# Patient Record
Sex: MALE | Race: BLACK OR AFRICAN AMERICAN | Employment: FULL TIME | URBAN - METROPOLITAN AREA
[De-identification: names, ages, dates, MRNs, and addresses within clinical notes are randomized per-mention and may not be internally consistent; named-entity substitution may affect disease eponyms.]

---

## 2018-03-12 ENCOUNTER — HOSPITAL ENCOUNTER (OUTPATIENT)
Dept: CT IMAGING | Facility: CLINIC | Age: 53
Discharge: HOME | End: 2018-03-14
Payer: COMMERCIAL

## 2018-03-12 DIAGNOSIS — R51.9 NONINTRACTABLE HEADACHE, UNSPECIFIED CHRONICITY PATTERN, UNSPECIFIED HEADACHE TYPE: ICD-10-CM

## 2018-03-12 PROCEDURE — 2580000003 HC RX 258: Performed by: INTERNAL MEDICINE

## 2018-03-12 PROCEDURE — 6360000004 HC RX CONTRAST MEDICATION: Performed by: INTERNAL MEDICINE

## 2018-03-12 PROCEDURE — 70460 CT HEAD/BRAIN W/DYE: CPT

## 2018-03-12 RX ORDER — SODIUM CHLORIDE 0.9 % (FLUSH) 0.9 %
10 SYRINGE (ML) INJECTION PRN
Status: DISCONTINUED | OUTPATIENT
Start: 2018-03-12 | End: 2018-03-15 | Stop reason: HOSPADM

## 2018-03-12 RX ORDER — 0.9 % SODIUM CHLORIDE 0.9 %
70 INTRAVENOUS SOLUTION INTRAVENOUS ONCE
Status: COMPLETED | OUTPATIENT
Start: 2018-03-12 | End: 2018-03-12

## 2018-03-12 RX ADMIN — SODIUM CHLORIDE 70 ML: 9 INJECTION, SOLUTION INTRAVENOUS at 14:48

## 2018-03-12 RX ADMIN — IOPAMIDOL 75 ML: 755 INJECTION, SOLUTION INTRAVENOUS at 14:47

## 2018-03-12 RX ADMIN — Medication 10 ML: at 14:48

## 2022-08-12 ENCOUNTER — APPOINTMENT (OUTPATIENT)
Dept: GENERAL RADIOLOGY | Age: 57
End: 2022-08-12
Payer: COMMERCIAL

## 2022-08-12 ENCOUNTER — HOSPITAL ENCOUNTER (EMERGENCY)
Age: 57
Discharge: HOME | End: 2022-08-12
Attending: EMERGENCY MEDICINE
Payer: COMMERCIAL

## 2022-08-12 VITALS
HEIGHT: 72 IN | RESPIRATION RATE: 18 BRPM | DIASTOLIC BLOOD PRESSURE: 88 MMHG | BODY MASS INDEX: 33.18 KG/M2 | OXYGEN SATURATION: 97 % | HEART RATE: 100 BPM | SYSTOLIC BLOOD PRESSURE: 132 MMHG | WEIGHT: 245 LBS | TEMPERATURE: 98.1 F

## 2022-08-12 DIAGNOSIS — R73.9 HYPERGLYCEMIA: ICD-10-CM

## 2022-08-12 DIAGNOSIS — R07.9 CHEST PAIN, UNSPECIFIED TYPE: Primary | ICD-10-CM

## 2022-08-12 LAB
ABSOLUTE EOS #: 0.14 K/UL (ref 0–0.44)
ABSOLUTE IMMATURE GRANULOCYTE: 0.01 K/UL (ref 0–0.3)
ABSOLUTE LYMPH #: 2.12 K/UL (ref 1.1–3.7)
ABSOLUTE MONO #: 0.41 K/UL (ref 0.1–1.2)
ANION GAP SERPL CALCULATED.3IONS-SCNC: 10 MMOL/L (ref 9–17)
BASOPHILS # BLD: 1 % (ref 0–2)
BASOPHILS ABSOLUTE: 0.03 K/UL (ref 0–0.2)
BETA-HYDROXYBUTYRATE: 0.08 MMOL/L (ref 0.02–0.27)
BUN BLDV-MCNC: 13 MG/DL (ref 6–20)
BUN/CREAT BLD: 13 (ref 9–20)
CALCIUM SERPL-MCNC: 9.7 MG/DL (ref 8.6–10.4)
CHLORIDE BLD-SCNC: 95 MMOL/L (ref 98–107)
CHP ED QC CHECK: YES
CO2: 28 MMOL/L (ref 20–31)
CREAT SERPL-MCNC: 0.97 MG/DL (ref 0.7–1.2)
EOSINOPHILS RELATIVE PERCENT: 3 % (ref 1–4)
GFR AFRICAN AMERICAN: >60 ML/MIN
GFR NON-AFRICAN AMERICAN: >60 ML/MIN
GFR SERPL CREATININE-BSD FRML MDRD: ABNORMAL ML/MIN/{1.73_M2}
GLUCOSE BLD-MCNC: 296 MG/DL (ref 75–110)
GLUCOSE BLD-MCNC: 356 MG/DL
GLUCOSE BLD-MCNC: 356 MG/DL (ref 75–110)
GLUCOSE BLD-MCNC: 483 MG/DL (ref 70–99)
HCO3 VENOUS: 26.1 MMOL/L (ref 22–29)
HCT VFR BLD CALC: 46.2 % (ref 40.7–50.3)
HEMOGLOBIN: 15.6 G/DL (ref 13–17)
IMMATURE GRANULOCYTES: 0 %
LYMPHOCYTES # BLD: 41 % (ref 24–43)
MCH RBC QN AUTO: 28.2 PG (ref 25.2–33.5)
MCHC RBC AUTO-ENTMCNC: 33.8 G/DL (ref 28.4–34.8)
MCV RBC AUTO: 83.5 FL (ref 82.6–102.9)
MONOCYTES # BLD: 8 % (ref 3–12)
MYOGLOBIN: <21 NG/ML (ref 28–72)
MYOGLOBIN: <21 NG/ML (ref 28–72)
NRBC AUTOMATED: 0 PER 100 WBC
O2 SAT, VEN: 99 % (ref 60–85)
PCO2, VEN: 35.2 MM HG (ref 41–51)
PDW BLD-RTO: 12.7 % (ref 11.8–14.4)
PH VENOUS: 7.48 (ref 7.32–7.43)
PLATELET # BLD: 229 K/UL (ref 138–453)
PMV BLD AUTO: 10.6 FL (ref 8.1–13.5)
PO2, VEN: 111.2 MM HG (ref 30–50)
POSITIVE BASE EXCESS, VEN: 3 (ref 0–3)
POTASSIUM SERPL-SCNC: 4.4 MMOL/L (ref 3.7–5.3)
RBC # BLD: 5.53 M/UL (ref 4.21–5.77)
SEG NEUTROPHILS: 47 % (ref 36–65)
SEGMENTED NEUTROPHILS ABSOLUTE COUNT: 2.48 K/UL (ref 1.5–8.1)
SODIUM BLD-SCNC: 133 MMOL/L (ref 135–144)
TROPONIN, HIGH SENSITIVITY: 8 NG/L (ref 0–22)
TROPONIN, HIGH SENSITIVITY: 9 NG/L (ref 0–22)
WBC # BLD: 5.2 K/UL (ref 3.5–11.3)

## 2022-08-12 PROCEDURE — 83874 ASSAY OF MYOGLOBIN: CPT

## 2022-08-12 PROCEDURE — 71045 X-RAY EXAM CHEST 1 VIEW: CPT

## 2022-08-12 PROCEDURE — 6370000000 HC RX 637 (ALT 250 FOR IP): Performed by: PHYSICIAN ASSISTANT

## 2022-08-12 PROCEDURE — 80048 BASIC METABOLIC PNL TOTAL CA: CPT

## 2022-08-12 PROCEDURE — 85025 COMPLETE CBC W/AUTO DIFF WBC: CPT

## 2022-08-12 PROCEDURE — 82803 BLOOD GASES ANY COMBINATION: CPT

## 2022-08-12 PROCEDURE — 84484 ASSAY OF TROPONIN QUANT: CPT

## 2022-08-12 PROCEDURE — 99285 EMERGENCY DEPT VISIT HI MDM: CPT

## 2022-08-12 PROCEDURE — 96360 HYDRATION IV INFUSION INIT: CPT

## 2022-08-12 PROCEDURE — 93005 ELECTROCARDIOGRAM TRACING: CPT | Performed by: PHYSICIAN ASSISTANT

## 2022-08-12 PROCEDURE — 82947 ASSAY GLUCOSE BLOOD QUANT: CPT

## 2022-08-12 PROCEDURE — 2580000003 HC RX 258: Performed by: PHYSICIAN ASSISTANT

## 2022-08-12 PROCEDURE — 96372 THER/PROPH/DIAG INJ SC/IM: CPT

## 2022-08-12 PROCEDURE — 82010 KETONE BODYS QUAN: CPT

## 2022-08-12 RX ORDER — ASPIRIN 81 MG/1
TABLET, CHEWABLE ORAL
Status: DISCONTINUED
Start: 2022-08-12 | End: 2022-08-12 | Stop reason: HOSPADM

## 2022-08-12 RX ORDER — ASPIRIN 81 MG/1
324 TABLET, CHEWABLE ORAL ONCE
Status: COMPLETED | OUTPATIENT
Start: 2022-08-12 | End: 2022-08-12

## 2022-08-12 RX ORDER — 0.9 % SODIUM CHLORIDE 0.9 %
1000 INTRAVENOUS SOLUTION INTRAVENOUS ONCE
Status: COMPLETED | OUTPATIENT
Start: 2022-08-12 | End: 2022-08-12

## 2022-08-12 RX ADMIN — INSULIN HUMAN 5 UNITS: 100 INJECTION, SOLUTION PARENTERAL at 18:33

## 2022-08-12 RX ADMIN — ASPIRIN 81 MG CHEWABLE TABLET 324 MG: 81 TABLET CHEWABLE at 16:16

## 2022-08-12 RX ADMIN — SODIUM CHLORIDE 1000 ML: 9 INJECTION, SOLUTION INTRAVENOUS at 16:22

## 2022-08-12 ASSESSMENT — PAIN - FUNCTIONAL ASSESSMENT
PAIN_FUNCTIONAL_ASSESSMENT: NONE - DENIES PAIN
PAIN_FUNCTIONAL_ASSESSMENT: 0-10

## 2022-08-12 ASSESSMENT — PAIN SCALES - GENERAL: PAINLEVEL_OUTOF10: 3

## 2022-08-12 ASSESSMENT — HEART SCORE: ECG: 0

## 2022-08-12 NOTE — ED PROVIDER NOTES
EMERGENCY DEPARTMENT ENCOUNTER   ATTENDING ATTESTATION     Pt Name: Scarlett Patrick  MRN: [id number]  [place] 1965  Date of evaluation: 8/12/22   Scarlett Patrick is a 56 y.o. male with CC: Chest Pain (Tingly around left chest x2 weeks hurts at time)    MDM:   Patient is a 61-year-old male with a history of hypertension diabetes newly began therapy for diabetes resented to the emergency department secondary to tingling in his chest x2 weeks. The normal sinus rhythm no acute ST wave elevations or findings consistent with STEMI. Chest x-ray no infiltrate dissection with thorax. Elevated blood sugar, no evidence of DKA. Patient received IV fluids. Troponins trended, patient discharged home with outpatient follow-up and given parameters to return to the emergency department. This visit was performed by both a physician and an APC. I personally evaluated and examined the patient. I performed all aspects of the MDM as documented. CRITICAL CARE:       EKG: All EKG's are interpreted by the Emergency Department Physician who either signs or Co-signs this chart in the absence of a cardiologist.      RADIOLOGY:All plain film, CT, MRI, and formal ultrasound images (except ED bedside ultrasound) are read by the radiologist, see reports below, unless otherwise noted in MDM or here. XR CHEST PORTABLE   Final Result   No acute cardiopulmonary disease           LABS: All lab results were reviewed by myself, and all abnormals are listed below.   Labs Reviewed   TROP/MYOGLOBIN - Abnormal; Notable for the following components:       Result Value    Myoglobin <21 (*)     All other components within normal limits   BASIC METABOLIC PANEL - Abnormal; Notable for the following components:    Glucose 483 (*)     Sodium 133 (*)     Chloride 95 (*)     All other components within normal limits   VENOUS BLOOD GAS, POINT OF CARE - Abnormal; Notable for the following components:    pH, Nirmal 7.478 (*)     pCO2, Nirmal 35.2 (*) pO2, Nirmal 111.2 (*)     O2 Sat, Nirmal 99 (*)     All other components within normal limits   CBC WITH AUTO DIFFERENTIAL   BETA-HYDROXYBUTYRATE   TROP/MYOGLOBIN   TROP/MYOGLOBIN     CONSULTS:  None  FINAL IMPRESSION    No diagnosis found. PASTMEDICAL HISTORY   History reviewed. No pertinent past medical history. SURGICAL HISTORY     History reviewed. No pertinent surgical history. CURRENT MEDICATIONS       Previous Medications    METFORMIN HCL PO    Take by mouth     ALLERGIES     has No Known Allergies. FAMILY HISTORY     has no family status information on file. SOCIAL HISTORY       Social History     Tobacco Use    Smoking status: Never    Smokeless tobacco: Never   Vaping Use    Vaping Use: Never used   Substance Use Topics    Alcohol use: Not Currently          Margy Arora MD  The care is provided during an unprecedented national emergency due to the novel coronavirus, COVID 19.   Attending Emergency Physician         Margy Arora MD  17/70/03 Avery Avina MD  64/70/06 [id number]

## 2022-08-12 NOTE — ED PROVIDER NOTES
[address] [place] [address] ED  [facility]      Pt Name: Lolita Price  MRN: [id number]  [place] 1965  Date ofevaluation: 8/12/2022  Provider: Vale Merlin, [address]       Chief Complaint   Patient presents with    Chest Pain     Tingly around left chest x2 weeks hurts at time         HISTORY OF PRESENT ILLNESS  (Location/Symptom, Timing/Onset, Context/Setting, Quality, Duration, Modifying Factors, Severity.)   Lolita Price is a 56 y.o. male who presents to the emergency department with chest tingling around the left pectoral area over the last 2 weeks. Discomfort is intermittent. Patient works in lawn care and states he notices no issues or increase in symptoms when exerting self. No fevers or chills. No nausea or vomiting. No diarrhea. Nursing Notes were reviewed. ALLERGIES     Patient has no known allergies. CURRENT MEDICATIONS       Previous Medications    METFORMIN HCL PO    Take by mouth       PAST MEDICAL HISTORY   History reviewed. No pertinent past medical history. SURGICAL HISTORY     History reviewed. No pertinent surgical history. FAMILY HISTORY     History reviewed. No pertinent family history. No family status information on file. SOCIAL HISTORY      reports that he has never smoked. He has never used smokeless tobacco. He reports that he does not currently use alcohol. Drug: Marijuana Berneta Mert). REVIEW OFSYSTEMS    (2-9 systems for level 4, 10 or more for level 5)   Review of Systems    Except as noted above the remainder of the review of systems was reviewed and negative.      PHYSICAL EXAM    (up to 7 for level 4, 8 or more for level 5)     ED Triage Vitals   BP Temp Temp Source Heart Rate Resp SpO2 Height Weight   08/12/22 1445 08/12/22 1443 08/12/22 1443 08/12/22 1443 08/12/22 1443 08/12/22 1443 08/12/22 1443 08/12/22 1443   132/88 98.1 °F (36.7 °C) Oral 100 18 97 % 6' (1.829 m) 245 lb (111.1 kg)      Physical Exam  Constitutional:       Appearance: He is well-developed. HENT:      Head: Normocephalic and atraumatic. Cardiovascular:      Rate and Rhythm: Normal rate and regular rhythm. Pulmonary:      Effort: Pulmonary effort is normal.      Breath sounds: Normal breath sounds. Abdominal:      Palpations: Abdomen is soft. Musculoskeletal:         General: Normal range of motion. Cervical back: Normal range of motion and neck supple. Skin:     General: Skin is warm. Neurological:      Mental Status: He is alert and oriented to person, place, and time.    Psychiatric:         Behavior: Behavior normal.               DIAGNOSTIC RESULTS     EKG: All EKG's are interpreted by the Emergency Department Physician who either signs or Co-signs this chart in the absence of a cardiologist.        RADIOLOGY:   Non-plain film images such as CT, Ultrasound and MRI are read by the radiologist. Plain radiographic images arevisualized and preliminarily interpreted by the emergency physician with the below findings:        Interpretation per the Radiologist below, if available at thetime of this note:          ED BEDSIDE ULTRASOUND:   Performed by ED Physician - none    LABS:  Labs Reviewed   TROP/MYOGLOBIN - Abnormal; Notable for the following components:       Result Value    Myoglobin <21 (*)     All other components within normal limits   TROP/MYOGLOBIN - Abnormal; Notable for the following components:    Myoglobin <21 (*)     All other components within normal limits   BASIC METABOLIC PANEL - Abnormal; Notable for the following components:    Glucose 483 (*)     Sodium 133 (*)     Chloride 95 (*)     All other components within normal limits   VENOUS BLOOD GAS, POINT OF CARE - Abnormal; Notable for the following components:    pH, Nirmal 7.478 (*)     pCO2, Nirmal 35.2 (*)     pO2, Nirmal 111.2 (*)     O2 Sat, Nirmal 99 (*)     All other components within normal limits   POC GLUCOSE FINGERSTICK - Abnormal; Notable for the

## 2022-08-14 LAB
EKG ATRIAL RATE: 88 BPM
EKG P AXIS: 58 DEGREES
EKG P-R INTERVAL: 190 MS
EKG Q-T INTERVAL: 366 MS
EKG QRS DURATION: 90 MS
EKG QTC CALCULATION (BAZETT): 442 MS
EKG R AXIS: 41 DEGREES
EKG T AXIS: 42 DEGREES
EKG VENTRICULAR RATE: 88 BPM

## 2025-04-03 ENCOUNTER — HOSPITAL ENCOUNTER (EMERGENCY)
Age: 60
Discharge: HOME | End: 2025-04-03
Attending: EMERGENCY MEDICINE
Payer: COMMERCIAL

## 2025-04-03 ENCOUNTER — APPOINTMENT (OUTPATIENT)
Dept: GENERAL RADIOLOGY | Age: 60
End: 2025-04-03
Payer: COMMERCIAL

## 2025-04-03 VITALS
BODY MASS INDEX: 33.23 KG/M2 | SYSTOLIC BLOOD PRESSURE: 142 MMHG | WEIGHT: 245 LBS | HEART RATE: 72 BPM | RESPIRATION RATE: 16 BRPM | OXYGEN SATURATION: 94 % | DIASTOLIC BLOOD PRESSURE: 90 MMHG

## 2025-04-03 DIAGNOSIS — R07.9 CHEST PAIN, UNSPECIFIED TYPE: Primary | ICD-10-CM

## 2025-04-03 LAB
ANION GAP SERPL CALCULATED.3IONS-SCNC: 11 MMOL/L (ref 9–16)
BASOPHILS # BLD: 0.03 K/UL (ref 0–0.2)
BASOPHILS NFR BLD: 1 % (ref 0–2)
BUN SERPL-MCNC: 13 MG/DL (ref 6–20)
CALCIUM SERPL-MCNC: 9 MG/DL (ref 8.6–10.4)
CHLORIDE SERPL-SCNC: 101 MMOL/L (ref 98–107)
CO2 SERPL-SCNC: 24 MMOL/L (ref 20–31)
CREAT SERPL-MCNC: 0.9 MG/DL (ref 0.7–1.2)
EOSINOPHIL # BLD: 0.14 K/UL (ref 0–0.44)
EOSINOPHILS RELATIVE PERCENT: 3 % (ref 1–4)
ERYTHROCYTE [DISTWIDTH] IN BLOOD BY AUTOMATED COUNT: 12.8 % (ref 11.8–14.4)
GFR, ESTIMATED: >90 ML/MIN/1.73M2
GLUCOSE SERPL-MCNC: 259 MG/DL (ref 74–99)
HCT VFR BLD AUTO: 44.6 % (ref 40.7–50.3)
HGB BLD-MCNC: 15.1 G/DL (ref 13–17)
IMM GRANULOCYTES # BLD AUTO: 0.01 K/UL (ref 0–0.3)
IMM GRANULOCYTES NFR BLD: 0 %
LYMPHOCYTES NFR BLD: 1.74 K/UL (ref 1.1–3.7)
LYMPHOCYTES RELATIVE PERCENT: 36 % (ref 24–43)
MCH RBC QN AUTO: 29 PG (ref 25.2–33.5)
MCHC RBC AUTO-ENTMCNC: 33.9 G/DL (ref 28.4–34.8)
MCV RBC AUTO: 85.6 FL (ref 82.6–102.9)
MONOCYTES NFR BLD: 0.43 K/UL (ref 0.1–1.2)
MONOCYTES NFR BLD: 9 % (ref 3–12)
NEUTROPHILS NFR BLD: 51 % (ref 36–65)
NEUTS SEG NFR BLD: 2.48 K/UL (ref 1.5–8.1)
NRBC BLD-RTO: 0 PER 100 WBC
PLATELET # BLD AUTO: 187 K/UL (ref 138–453)
PMV BLD AUTO: 10.3 FL (ref 8.1–13.5)
POTASSIUM SERPL-SCNC: 4.3 MMOL/L (ref 3.7–5.3)
RBC # BLD AUTO: 5.21 M/UL (ref 4.21–5.77)
SODIUM SERPL-SCNC: 136 MMOL/L (ref 136–145)
TROPONIN I SERPL HS-MCNC: 7 NG/L (ref 0–22)
TROPONIN I SERPL HS-MCNC: 8 NG/L (ref 0–22)
WBC OTHER # BLD: 4.8 K/UL (ref 3.5–11.3)

## 2025-04-03 PROCEDURE — 80048 BASIC METABOLIC PNL TOTAL CA: CPT

## 2025-04-03 PROCEDURE — 85025 COMPLETE CBC W/AUTO DIFF WBC: CPT

## 2025-04-03 PROCEDURE — 93005 ELECTROCARDIOGRAM TRACING: CPT | Performed by: EMERGENCY MEDICINE

## 2025-04-03 PROCEDURE — 84484 ASSAY OF TROPONIN QUANT: CPT

## 2025-04-03 PROCEDURE — 71045 X-RAY EXAM CHEST 1 VIEW: CPT

## 2025-04-03 PROCEDURE — 99285 EMERGENCY DEPT VISIT HI MDM: CPT

## 2025-04-03 ASSESSMENT — PAIN SCALES - GENERAL: PAINLEVEL_OUTOF10: 1

## 2025-04-03 ASSESSMENT — PAIN DESCRIPTION - LOCATION: LOCATION: CHEST

## 2025-04-03 ASSESSMENT — PAIN - FUNCTIONAL ASSESSMENT: PAIN_FUNCTIONAL_ASSESSMENT: 0-10

## 2025-04-03 ASSESSMENT — HEART SCORE: ECG: NORMAL

## 2025-04-03 ASSESSMENT — PAIN DESCRIPTION - ORIENTATION: ORIENTATION: LEFT

## 2025-04-03 ASSESSMENT — PAIN DESCRIPTION - DESCRIPTORS: DESCRIPTORS: ACHING

## 2025-04-03 NOTE — DISCHARGE INSTRUCTIONS
It is very to follow-up closely with your primary care provider.  Stress test is likely the neck step in diagnosing.  If you have any worsening of symptoms you may return to the emergency room at any time.

## 2025-04-03 NOTE — ED PROVIDER NOTES
EMERGENCY DEPARTMENT ENCOUNTER    Pt Name: Dinh Pierce  MRN: [id number]  Birthdate 1965  Date of evaluation: 4/3/25  CHIEF COMPLAINT       Chief Complaint   Patient presents with    Chest Pain     Pt reports left sided chest pain on and off for several days. States pain was mild but today woke him from sleep. Currently rates pain 1/10 but was 9/10 earlier today. Denies SOB/dizziness.     HISTORY OF PRESENT ILLNESS   60-year-old male presents emergency room with progressive heaviness to the left side of his chest.  Patient reports he has experienced a little bit of discomfort on and off for the last couple of days.  Previous episodes have been more minor.  Today when he woke up the heaviness was more severe radiating to the shoulder.  No other constitutional symptoms.  Patient has history of diabetes no other major medical problems that he is aware of.             REVIEW OF SYSTEMS     Review of Systems   Cardiovascular:  Positive for chest pain.     PASTMEDICAL HISTORY   No past medical history on file.  Past Problem List  There is no problem list on file for this patient.    SURGICAL HISTORY     No past surgical history on file.  CURRENT MEDICATIONS       Discharge Medication List as of 4/3/2025 11:00 AM        CONTINUE these medications which have NOT CHANGED    Details   METFORMIN HCL PO Take by mouthHistorical Med           ALLERGIES     has no known allergies.  FAMILY HISTORY     has no family status information on file.      SOCIAL HISTORY       Social History     Tobacco Use    Smoking status: Never    Smokeless tobacco: Never   Vaping Use    Vaping status: Never Used   Substance Use Topics    Alcohol use: Not Currently     PHYSICAL EXAM     INITIAL VITALS: BP (!) 142/90   Pulse 72   Resp 16   Wt 111.1 kg (245 lb)   SpO2 94%   BMI 33.23 kg/m²    Physical Exam  Constitutional:       General: He is not in acute distress.     Appearance: He is well-developed.   HENT:      Head: Normocephalic.   Eyes:

## 2025-04-04 LAB
EKG ATRIAL RATE: 73 BPM
EKG P AXIS: 47 DEGREES
EKG P-R INTERVAL: 202 MS
EKG Q-T INTERVAL: 390 MS
EKG QRS DURATION: 88 MS
EKG QTC CALCULATION (BAZETT): 429 MS
EKG R AXIS: 30 DEGREES
EKG T AXIS: 39 DEGREES
EKG VENTRICULAR RATE: 73 BPM